# Patient Record
(demographics unavailable — no encounter records)

---

## 2024-10-30 NOTE — HISTORY OF PRESENT ILLNESS
[FreeTextEntry1] : New patient presents to the office today for arrangement of first screening colonoscopy.  The patient is a 49-year-old male with minimal past medical history including hyperlipidemia.  There is a family history of breast cancer in his mother he takes no medication except for fish oil and statin drug he has no allergies to food or drug.

## 2024-10-30 NOTE — PHYSICAL EXAM

## 2024-10-30 NOTE — ASSESSMENT
[FreeTextEntry1] : Impression and plan Patient came to the office today to arrange for for screening colonoscopy.  The risks benefits alternatives limitation procedure were discussed.  Patient will schedule his convenience.